# Patient Record
Sex: FEMALE | Race: WHITE | NOT HISPANIC OR LATINO | Employment: FULL TIME | URBAN - METROPOLITAN AREA
[De-identification: names, ages, dates, MRNs, and addresses within clinical notes are randomized per-mention and may not be internally consistent; named-entity substitution may affect disease eponyms.]

---

## 2023-07-05 ENCOUNTER — OFFICE VISIT (OUTPATIENT)
Dept: PRIMARY CARE | Facility: CLINIC | Age: 58
End: 2023-07-05
Payer: COMMERCIAL

## 2023-07-05 VITALS
BODY MASS INDEX: 27.06 KG/M2 | OXYGEN SATURATION: 99 % | DIASTOLIC BLOOD PRESSURE: 100 MMHG | WEIGHT: 178 LBS | HEART RATE: 105 BPM | SYSTOLIC BLOOD PRESSURE: 160 MMHG

## 2023-07-05 DIAGNOSIS — N30.00 ACUTE CYSTITIS WITHOUT HEMATURIA: Primary | ICD-10-CM

## 2023-07-05 PROBLEM — G43.909 MIGRAINE HEADACHE: Status: ACTIVE | Noted: 2023-07-05

## 2023-07-05 PROBLEM — B37.89 PHARYNGEAL CANDIDIASIS: Status: RESOLVED | Noted: 2023-07-05 | Resolved: 2023-07-05

## 2023-07-05 PROBLEM — D05.11 DUCTAL CARCINOMA IN SITU (DCIS) OF RIGHT BREAST: Status: ACTIVE | Noted: 2017-08-02

## 2023-07-05 PROBLEM — H10.32 ACUTE BACTERIAL CONJUNCTIVITIS OF LEFT EYE: Status: RESOLVED | Noted: 2023-07-05 | Resolved: 2023-07-05

## 2023-07-05 PROBLEM — S39.012A LUMBAR STRAIN: Status: RESOLVED | Noted: 2023-07-05 | Resolved: 2023-07-05

## 2023-07-05 PROBLEM — F41.9 ANXIETY: Status: ACTIVE | Noted: 2023-07-05

## 2023-07-05 PROBLEM — S23.9XXA THORACIC SPRAIN: Status: RESOLVED | Noted: 2023-07-05 | Resolved: 2023-07-05

## 2023-07-05 PROBLEM — I49.9 ARRHYTHMIA: Status: ACTIVE | Noted: 2023-07-05

## 2023-07-05 PROBLEM — H04.559 LACRIMAL DUCT STENOSIS: Status: RESOLVED | Noted: 2023-07-05 | Resolved: 2023-07-05

## 2023-07-05 PROBLEM — K52.9 GASTROENTERITIS: Status: RESOLVED | Noted: 2023-07-05 | Resolved: 2023-07-05

## 2023-07-05 PROBLEM — M67.979 TIBIALIS POSTERIOR TENDINOPATHY: Status: RESOLVED | Noted: 2023-07-05 | Resolved: 2023-07-05

## 2023-07-05 PROCEDURE — 1036F TOBACCO NON-USER: CPT | Performed by: NURSE PRACTITIONER

## 2023-07-05 PROCEDURE — 99213 OFFICE O/P EST LOW 20 MIN: CPT | Performed by: NURSE PRACTITIONER

## 2023-07-05 RX ORDER — IBUPROFEN 100 MG/5ML
1 SUSPENSION, ORAL (FINAL DOSE FORM) ORAL DAILY
COMMUNITY
Start: 2018-05-08

## 2023-07-05 RX ORDER — CEPHALEXIN 500 MG/1
500 CAPSULE ORAL 2 TIMES DAILY
COMMUNITY

## 2023-07-05 RX ORDER — GLUCOSAMINE HCL 500 MG
1 TABLET ORAL DAILY
COMMUNITY
Start: 2018-05-08

## 2023-07-05 ASSESSMENT — ENCOUNTER SYMPTOMS
HEMATURIA: 0
SHORTNESS OF BREATH: 0
ABDOMINAL PAIN: 0
FREQUENCY: 0
FEVER: 0
DIARRHEA: 0
PALPITATIONS: 0
COUGH: 0
NAUSEA: 0
CHILLS: 0
VOMITING: 0
DYSURIA: 0

## 2023-07-05 NOTE — PATIENT INSTRUCTIONS
Continue Keflex as prescribed by ER  Call if you develop new symptoms  Please call with a reading of your home Blood pressure over the next few days

## 2023-07-05 NOTE — PROGRESS NOTES
Subjective   Chief Complaint: ER Follow-up (UTI 7/3. ).    HPI   Belle De Santiago is a 57 y.o. female who presents for ER Follow-up (UTI 7/3. ).    Patient presents with UTI. She reports she went to ER on 7/3/2023 and was told she has a UTI and urine culture was sent to the lab. She was started on keflex and reports improved symptoms. Patient is going out to town in a few days and wanted to find out if the ER placed her on the right medication.  I advised patient that keflex is fine and that we would know more once the urine culture results are back. She does report improved symptoms. Her urine is no longer with blood and denies dysuria.     Patient denies fever, chills, nausea, vomiting, diarrhea, chest pain, heart palpations, or shortness of breath.     She has a history of white coat syndrome and her BP is elevated in office today. Recommend patient check BP at home and call us with readings.     Review of Systems   Constitutional:  Negative for chills and fever.   Respiratory:  Negative for cough and shortness of breath.    Cardiovascular:  Negative for chest pain and palpitations.   Gastrointestinal:  Negative for abdominal pain, diarrhea, nausea and vomiting.   Genitourinary:  Negative for dysuria, frequency, hematuria and urgency.       Objective   BP (!) 153/105   Pulse 105   Wt 80.7 kg (178 lb)   SpO2 99%   BMI 27.06 kg/m²   BSA Body surface area is 1.97 meters squared.      Physical Exam  Constitutional:       Appearance: Normal appearance.   Neurological:      Mental Status: She is alert.       No visits with results within 1 Year(s) from this visit.   Latest known visit with results is:   [facility] Encounter on 05/23/2022   Component Date Value Ref Range Status    Flu A Result 05/23/2022 NOT DETECTED  Not Detected Final    Comment:  Respiratory virus testing is performed routinely by PCR    for Influenza A/B and RSV. If Influenza and RSV PCR are    negative, testing for parainfluenza 1,2,3 viruses and     adenovirus is routinely performed for oncology inpatients    and intensive care unit patients at [facility] and is available   on request on other patients by calling Laboratory Client   Services at [phone].   Not Detected results do not preclude Influenza A/B or RSV   infections since the adequacy of sample collection or low   viral burden may impact the clinical sensitivity of this   test method.      Flu B Result 05/23/2022 NOT DETECTED  Not Detected Final    Comment:  Respiratory virus testing is performed routinely by PCR    for Influenza A/B and RSV. If Influenza and RSV PCR are    negative, testing for parainfluenza 1,2,3 viruses and    adenovirus is routinely performed for oncology inpatients    and intensive care unit patients at [facility] and is available   on request on other patients by calling Laboratory Client   Services at [phone]   Not Detected results do not preclude Influenza A/B or RSV   infections since the adequacy of sample collection or low   viral burden may impact the clinical sensitivity of this   test method.  .  The TaqManTM SARS-CoV-2, Flu A, Flu B Multiplex Assay is a multiplex,   real-time RT-PCR assay for the detection of RNA from the SARS-CoV-2,   Influenza A, and Influenza B viruses. A negative result does not preclude   the possibility of SARS-CoV-2, Influenza A, or Influenza B infections, and   should not be used as the sole basis for patient management decision as a   negative result may be caused by very low level                           s of infection, collection   errors, or testing errors.   .  This test was developed and its performance characteristics were determined   by the Microbiology Laboratory, Department of Pathology, [facility], [place], [place]. It has not been cleared   or approved by the US Food and Drug Administration; however, FDA clearance   or approval is not currently required for clinical use. This test should not   be  regarded as investigational or for research purposes.        SARS-COV-2 RESULT 05/23/2022 NOT DETECTED  Not Detected Final    Comment: .  This assay is designed to detect the N, ORF1ab and/or S genes of SARS-CoV-2   via nucleic acid amplification.  A Negative (NOT DETECTED) result does not   preclude 2019-nCoV infection since the adequacy of sample collection and/or   low viral burden may result in presence of viral nucleic acids below the   clinical sensitivity of this test method.  Negative (NOT DETECTED) result   should not be used as the sole basis for treatment or other patient   management decisions. Rather negative results should be combined with   clinical observations, patient history, and epidemiological information to   make patient management decisions.    Fact sheet for providers: https://www.fda.gov/media/764343/download  Fact sheet for patients: https://www.fda.gov/media/854782/download    This test has received FDA Emergency Use Authorization (EUA) and has been   verified by [facility] ([facility]). This test   is only authorized for the duration of time that circumstances                            exist to   justify the authorization of the emergency use of in vitro diagnostic tests   for the detection of SARS-CoV-2 virus and/or diagnosis of COVID-19 infection   under section 564(b)(1) of the Act, 21 U.S.C. 360bbb-3(b)(1), unless the   authorization is terminated or revoked sooner.      [facility] is certified under CLIA-88 as   qualified to perform high complexity testing. Testing is performed in the   [facility] laboratories located at [address] [place] [address].       Current Outpatient Medications on File Prior to Visit   Medication Sig Dispense Refill    ascorbic acid (Vitamin C) 1,000 mg tablet Take 1 tablet (1,000 mg) by mouth once daily.      cephalexin (Keflex) 500 mg capsule Take 1 capsule (500 mg) by mouth 2 times a  day.      cholecalciferol, vitamin D3, 75 mcg (3,000 unit) tablet Take 1 tablet (75 mcg) by mouth once daily.      multivitamin capsule Take 1 capsule by mouth once daily.       No current facility-administered medications on file prior to visit.     No images are attached to the encounter.            Assessment/Plan   Problem List Items Addressed This Visit       Acute cystitis without hematuria - Primary     Continue keflex  Follow urine culture from ER

## 2023-11-29 ENCOUNTER — TELEPHONE (OUTPATIENT)
Dept: PRIMARY CARE | Facility: CLINIC | Age: 58
End: 2023-11-29
Payer: COMMERCIAL

## 2023-11-29 DIAGNOSIS — H01.009 BLEPHARITIS, UNSPECIFIED LATERALITY, UNSPECIFIED TYPE: Primary | ICD-10-CM

## 2023-11-29 RX ORDER — AMOXICILLIN 875 MG/1
875 TABLET, FILM COATED ORAL 2 TIMES DAILY
Qty: 20 TABLET | Refills: 0 | Status: SHIPPED | OUTPATIENT
Start: 2023-11-29 | End: 2023-12-09

## 2023-11-29 NOTE — TELEPHONE ENCOUNTER
Recurring eye infection. The drops are not working and in the past amoxiclin has been called in, can this be called in with our her being seen  [facility]  Please follow up with pt

## 2024-11-29 ENCOUNTER — LAB (OUTPATIENT)
Dept: LAB | Facility: LAB | Age: 59
End: 2024-11-29
Payer: COMMERCIAL

## 2024-11-29 DIAGNOSIS — E55.9 VITAMIN D DEFICIENCY, UNSPECIFIED: Primary | ICD-10-CM

## 2024-11-29 DIAGNOSIS — R53.83 OTHER FATIGUE: ICD-10-CM

## 2024-11-29 DIAGNOSIS — R53.81 OTHER MALAISE: ICD-10-CM

## 2024-11-29 DIAGNOSIS — E78.00 PURE HYPERCHOLESTEROLEMIA, UNSPECIFIED: ICD-10-CM

## 2024-11-29 LAB
25(OH)D3 SERPL-MCNC: 57 NG/ML (ref 30–100)
ALBUMIN SERPL BCP-MCNC: 4.4 G/DL (ref 3.4–5)
ALP SERPL-CCNC: 113 U/L (ref 33–136)
ALT SERPL W P-5'-P-CCNC: 15 U/L (ref 7–45)
ANION GAP SERPL CALC-SCNC: 15 MMOL/L (ref 10–20)
AST SERPL W P-5'-P-CCNC: 19 U/L (ref 9–39)
BASOPHILS # BLD AUTO: 0.04 X10*3/UL (ref 0–0.1)
BASOPHILS NFR BLD AUTO: 0.7 %
BILIRUB SERPL-MCNC: 0.7 MG/DL (ref 0–1.2)
BUN SERPL-MCNC: 15 MG/DL (ref 6–23)
CALCIUM SERPL-MCNC: 9.3 MG/DL (ref 8.6–10.6)
CHLORIDE SERPL-SCNC: 102 MMOL/L (ref 98–107)
CHOLEST SERPL-MCNC: 182 MG/DL (ref 0–199)
CHOLESTEROL/HDL RATIO: 2.9
CO2 SERPL-SCNC: 28 MMOL/L (ref 21–32)
CREAT SERPL-MCNC: 0.74 MG/DL (ref 0.5–1.05)
EGFRCR SERPLBLD CKD-EPI 2021: >90 ML/MIN/1.73M*2
EOSINOPHIL # BLD AUTO: 0.19 X10*3/UL (ref 0–0.7)
EOSINOPHIL NFR BLD AUTO: 3.2 %
ERYTHROCYTE [DISTWIDTH] IN BLOOD BY AUTOMATED COUNT: 12.5 % (ref 11.5–14.5)
GLUCOSE SERPL-MCNC: 87 MG/DL (ref 74–99)
HCT VFR BLD AUTO: 46 % (ref 36–46)
HDLC SERPL-MCNC: 63 MG/DL
HGB BLD-MCNC: 15 G/DL (ref 12–16)
IMM GRANULOCYTES # BLD AUTO: 0.02 X10*3/UL (ref 0–0.7)
IMM GRANULOCYTES NFR BLD AUTO: 0.3 % (ref 0–0.9)
LDLC SERPL CALC-MCNC: 105 MG/DL
LYMPHOCYTES # BLD AUTO: 1.88 X10*3/UL (ref 1.2–4.8)
LYMPHOCYTES NFR BLD AUTO: 31.3 %
MCH RBC QN AUTO: 28.8 PG (ref 26–34)
MCHC RBC AUTO-ENTMCNC: 32.6 G/DL (ref 32–36)
MCV RBC AUTO: 89 FL (ref 80–100)
MONOCYTES # BLD AUTO: 0.56 X10*3/UL (ref 0.1–1)
MONOCYTES NFR BLD AUTO: 9.3 %
NEUTROPHILS # BLD AUTO: 3.31 X10*3/UL (ref 1.2–7.7)
NEUTROPHILS NFR BLD AUTO: 55.2 %
NON HDL CHOLESTEROL: 119 MG/DL (ref 0–149)
NRBC BLD-RTO: 0 /100 WBCS (ref 0–0)
PLATELET # BLD AUTO: 265 X10*3/UL (ref 150–450)
POTASSIUM SERPL-SCNC: 3.8 MMOL/L (ref 3.5–5.3)
PROT SERPL-MCNC: 6.8 G/DL (ref 6.4–8.2)
RBC # BLD AUTO: 5.2 X10*6/UL (ref 4–5.2)
SODIUM SERPL-SCNC: 141 MMOL/L (ref 136–145)
TRIGL SERPL-MCNC: 70 MG/DL (ref 0–149)
VLDL: 14 MG/DL (ref 0–40)
WBC # BLD AUTO: 6 X10*3/UL (ref 4.4–11.3)

## 2024-11-29 PROCEDURE — 80061 LIPID PANEL: CPT

## 2024-11-29 PROCEDURE — 80053 COMPREHEN METABOLIC PANEL: CPT

## 2024-11-29 PROCEDURE — 82306 VITAMIN D 25 HYDROXY: CPT

## 2024-11-29 PROCEDURE — 85025 COMPLETE CBC W/AUTO DIFF WBC: CPT

## 2025-08-26 DIAGNOSIS — J32.9 SINUSITIS, UNSPECIFIED CHRONICITY, UNSPECIFIED LOCATION: Primary | ICD-10-CM

## 2025-08-26 RX ORDER — AMOXICILLIN 875 MG/1
875 TABLET, COATED ORAL 2 TIMES DAILY
Qty: 20 TABLET | Refills: 0 | Status: SHIPPED | OUTPATIENT
Start: 2025-08-26 | End: 2025-09-05